# Patient Record
Sex: MALE | Race: BLACK OR AFRICAN AMERICAN | Employment: FULL TIME | URBAN - METROPOLITAN AREA
[De-identification: names, ages, dates, MRNs, and addresses within clinical notes are randomized per-mention and may not be internally consistent; named-entity substitution may affect disease eponyms.]

---

## 2018-02-09 ENCOUNTER — HOSPITAL ENCOUNTER (EMERGENCY)
Age: 48
Discharge: HOME | End: 2018-02-09
Attending: EMERGENCY MEDICINE
Payer: COMMERCIAL

## 2018-02-09 ENCOUNTER — APPOINTMENT (OUTPATIENT)
Dept: GENERAL RADIOLOGY | Age: 48
End: 2018-02-09
Attending: EMERGENCY MEDICINE
Payer: COMMERCIAL

## 2018-02-09 VITALS
HEART RATE: 70 BPM | RESPIRATION RATE: 16 BRPM | HEIGHT: 70 IN | TEMPERATURE: 98.8 F | DIASTOLIC BLOOD PRESSURE: 92 MMHG | BODY MASS INDEX: 38.65 KG/M2 | OXYGEN SATURATION: 100 % | WEIGHT: 270 LBS | SYSTOLIC BLOOD PRESSURE: 137 MMHG

## 2018-02-09 DIAGNOSIS — S67.10XA CRUSHING INJURY OF FINGER, LEFT: Primary | ICD-10-CM

## 2018-02-09 DIAGNOSIS — S61.309A NAIL AVULSION, FINGER, INITIAL ENCOUNTER: ICD-10-CM

## 2018-02-09 PROCEDURE — 99282 EMERGENCY DEPT VISIT SF MDM: CPT

## 2018-02-09 PROCEDURE — 75810000055 HC AVULSION NAIL PLATE SIMPLE

## 2018-02-09 PROCEDURE — 73140 X-RAY EXAM OF FINGER(S): CPT

## 2018-02-09 RX ORDER — CEPHALEXIN 500 MG/1
500 CAPSULE ORAL 2 TIMES DAILY
Qty: 14 CAP | Refills: 0 | Status: SHIPPED | OUTPATIENT
Start: 2018-02-09 | End: 2018-02-16

## 2018-02-09 RX ORDER — NAPROXEN 500 MG/1
500 TABLET ORAL 2 TIMES DAILY WITH MEALS
Qty: 20 TAB | Refills: 0 | Status: SHIPPED | OUTPATIENT
Start: 2018-02-09 | End: 2018-02-19

## 2018-02-09 NOTE — LETTER
[facility] [place] 
[facility] 
[address] [address] 
[phone] Work/School Note Date: 2/9/2018 To Whom It May concern: 
 
Jaquelyn Mcburney was seen and treated today in the emergency room by the following provider(s): 
Attending Provider: Kendrick Bedolla MD 
Physician Assistant: LES Novoa. Jaquelyn Mcburney may return to work on 2/12/18.  
 
Sincerely, 
 
 
 
 
 
 
Junie Quintero PA-C

## 2018-02-10 NOTE — DISCHARGE INSTRUCTIONS
Toenail or Fingernail Avulsion: Care Instructions  Your Care Instructions  Losing a toenail or fingernail because of an injury is called avulsion. The nail may be completely or partially torn off after a trauma to the area. Your doctor may have removed the nail, put part of it back into place, or repaired the nail bed. Your toe or finger may be sore after treatment. You may have stitches. You may have some swelling, color changes, and bloody crusting on or around the wound for 2 or 3 days. This is normal. Taking good care of your wound at home will help it heal quickly and reduce your chance of infection. The wound should heal within a few weeks. If completely removed, fingernails may take 6 months to grow back. Toenails may take 12 to 18 months to grow back. Injured nails may look different when they grow back. Follow-up care is a key part of your treatment and safety. Be sure to make and go to all appointments, and call your doctor if you are having problems. It's also a good idea to know your test results and keep a list of the medicines you take. How can you care for yourself at home? · If possible, prop up the injured area on a pillow anytime you sit or lie down during the next 3 days. Try to keep it above the level of your heart. This will help reduce swelling. · Leave the bandage on, and if you have stitches, do not get them wet for the first 24 to 48 hours. Use a plastic bag to cover the area when you shower. · If your doctor told you how to care for your wound, follow your doctor's instructions. If you did not get instructions, follow this general advice:  ¨ After the first 24 to 48 hours, you can remove the bandage and gently wash around the wound with clean water 2 times a day. If the bandage sticks to the wound, use warm water to loosen it. Do not scrub or soak the area. ¨ You may cover the wound with a thin layer of petroleum jelly, such as Vaseline, and a nonstick bandage.   ¨ Apply more petroleum jelly and replace the bandage as needed. · Do not go swimming. · If you have stitches, do not remove them on your own. Your doctor will tell you when to return to have the stitches removed. · Be safe with medicines. Take pain medicines exactly as directed. ¨ If the doctor gave you a prescription medicine for pain, take it as prescribed. ¨ If you are not taking a prescription pain medicine, ask your doctor if you can take an over-the-counter medicine. · If your doctor prescribed antibiotics, take them as directed. Do not stop taking them just because you feel better. You need to take the full course of antibiotics. When should you call for help? Call your doctor now or seek immediate medical care if:  ? · The skin near the wound is cool or pale or changes color. ? · The wound starts to bleed, and blood soaks through the bandage. Oozing small amounts of blood is normal.   ? · You have signs of infection, such as:  ¨ Increased pain, swelling, warmth, or redness. ¨ Red streaks leading from your toe or finger. ¨ Pus draining from your toe or finger. ¨ Swollen lymph nodes in your neck, armpits, or groin. ¨ A fever. ? Watch closely for changes in your health, and be sure to contact your doctor if:  ? · You have problems with the nail as it grows back. ? · You do not get better as expected. Where can you learn more? Go to http://jeor-falguni.info/. Enter T757 in the search box to learn more about \"Toenail or Fingernail Avulsion: Care Instructions. \"  Current as of: October 13, 2016  Content Version: 11.4  © 2220-8937 [employer]. Care instructions adapted under license by [employer] (which disclaims liability or warranty for this information). If you have questions about a medical condition or this instruction, always ask your healthcare professional. [address] any warranty or liability for your use of this information.

## 2018-02-10 NOTE — ED PROVIDER NOTES
EMERGENCY DEPARTMENT HISTORY AND PHYSICAL EXAM    Date: 2/9/2018  Patient Name: Nitza Rivera    History of Presenting Illness     Chief Complaint   Patient presents with    Finger Pain         History Provided By: Patient    Chief Complaint: Left second finger pain  Duration: 2 hours   Timing:  Acute  Location: Left second finger  Severity: 8 out of 10  Associated Symptoms: denies any other associated signs or symptoms    Additional History (Context):   8:33 PM  Nitza Rivera is a 41 y.o. male with PMHX HTN and high cholesterol who presents abulatory to the emergency department C/O left second finger pain (rated 8/10), onset 2 hours ago. Notes no other associated sxs. Pt reports that he smashed his left second finger in between two desks at work. Nail avulsion noted, no damage to nailbed. Pt is right handed. Pt denies fever, chills, or any other sxs or complaints. PCP: Rosa Salvador MD    Current Outpatient Prescriptions   Medication Sig Dispense Refill    ATORVASTATIN CALCIUM (ATORVASTATIN PO) Take  by mouth.  cephALEXin (KEFLEX) 500 mg capsule Take 1 Cap by mouth two (2) times a day for 7 days. 14 Cap 0    naproxen (NAPROSYN) 500 mg tablet Take 1 Tab by mouth two (2) times daily (with meals) for 10 days. 20 Tab 0    divalproex DR (DEPAKOTE) 500 mg tablet Take  by mouth three (3) times daily.  carBAMazepine XR (TEGRETOL XR) 400 mg SR tablet Take 400 mg by mouth two (2) times a day.  lisinopril (PRINIVIL, ZESTRIL) 20 mg tablet Take  by mouth daily.  ibuprofen (MOTRIN) 800 mg tablet Take  by mouth.  propranolol (INDERAL) 10 mg tablet Take 20 mg by mouth three (3) times daily. Past History     Past Medical History:  Past Medical History:   Diagnosis Date    High cholesterol     Hypertension     Seizures ([address]        Past Surgical History:  History reviewed. No pertinent surgical history. Family History:  History reviewed. No pertinent family history.     Social History:  Social History   Substance Use Topics    Smoking status: Never Smoker    Smokeless tobacco: None    Alcohol use No       Allergies: Allergies   Allergen Reactions    Dilantin [Phenytoin Sodium Extended] Swelling    Keppra [Levetiracetam] Unknown (comments)         Review of Systems   Review of Systems   Constitutional: Negative for chills and fever. Skin: Positive for wound (left index finger smash injury). Negative for color change. All other systems reviewed and are negative. Physical Exam     Vitals:    02/09/18 2000   BP: (!) 137/92   Pulse: 70   Resp: 16   Temp: 98.8 °F (37.1 °C)   SpO2: 100%   Weight: 122.5 kg (270 lb)   Height: 5' 10\" (1.778 m)     Physical Exam   Constitutional: He is oriented to person, place, and time. He appears well-developed and well-nourished. No distress. HENT:   Head: Normocephalic and atraumatic. Eyes: Conjunctivae and EOM are normal. Pupils are equal, round, and reactive to light. Neck: Normal range of motion. Neck supple. Musculoskeletal: Normal range of motion. He exhibits tenderness. He exhibits no deformity. Left wrist: Normal.        Left hand: He exhibits tenderness. He exhibits normal range of motion, normal two-point discrimination, normal capillary refill, no deformity, no laceration and no swelling. Normal sensation noted. Normal strength noted. Hands:  Proximal lateral partial nail avulsion (<1/3 of nail) barely attached, mild venous bleeding   Neurological: He is alert and oriented to person, place, and time. Skin: Skin is warm and dry. Psychiatric: He has a normal mood and affect. His behavior is normal.   Nursing note and vitals reviewed. Diagnostic Study Results     Labs -   No results found for this or any previous visit (from the past 12 hour(s)).     Radiologic Studies -    XR 2ND FINGER LT MIN 2 V    (Results Pending)     8:37 PM  RADIOLOGY FINDINGS  Left second finer X-ray shows no fx  Pending review by Radiologist  Recorded by Rachelle Lopez ED Scribe, as dictated by Linda Campos PA-C    CT Results  (Last 48 hours)    None        CXR Results  (Last 48 hours)    None            Medical Decision Making   I am the first provider for this patient. I reviewed the vital signs, available nursing notes, past medical history, past surgical history, family history and social history. Vital Signs-Reviewed the patient's vital signs. Pulse Oximetry Analysis - 100% on RA     Records Reviewed: Nursing Notes    Provider Notes (Medical Decision Making):     Procedures: Other Procedure  Date/Time: 2/9/2018 8:44 PM  Performed by: Brannon Truong  Authorized by: Shannen ROBERSON     Consent:     Consent obtained:  Verbal    Consent given by:  Patient    Risks discussed:  Bleeding, infection and pain  Pre-procedure details:     Procedure prep: shurcleans. Anesthesia (see MAR for exact dosages): Anesthesia method:  None  Post-procedure details:     Patient tolerance of procedure: Tolerated well, no immediate complications  Comments:      Wound cleansed with shurcleans, partial nail that has avulsed was completely removed, wound beneath again cleansed & dressed        ED Course:   8:33 PM   Initial assessment performed. The patients presenting problems have been discussed, and they are in agreement with the care plan formulated and outlined with them. I have encouraged them to ask questions as they arise throughout their visit. Diagnosis and Disposition       DISCHARGE NOTE:  8:40 PM  Katelin Arreola's  results have been reviewed with him. He has been counseled regarding his diagnosis, treatment, and plan. He verbally conveys understanding and agreement of the signs, symptoms, diagnosis, treatment and prognosis and additionally agrees to follow up as discussed. He also agrees with the care-plan and conveys that all of his questions have been answered.   I have also provided discharge instructions for him that include: educational information regarding their diagnosis and treatment, and list of reasons why they would want to return to the ED prior to their follow-up appointment, should his condition change. He has been provided with education for proper emergency department utilization. CLINICAL IMPRESSION:    1. Crushing injury of finger, left    2. Nail avulsion, finger, initial encounter        PLAN:  1. D/C Home  2. Current Discharge Medication List      START taking these medications    Details   cephALEXin (KEFLEX) 500 mg capsule Take 1 Cap by mouth two (2) times a day for 7 days. Qty: 14 Cap, Refills: 0      naproxen (NAPROSYN) 500 mg tablet Take 1 Tab by mouth two (2) times daily (with meals) for 10 days. Qty: 20 Tab, Refills: 0           3. Follow-up Information     Follow up With Details Comments Contact Info    Your PCP Schedule an appointment as soon as possible for a visit in 2 days for PCP follow up     [facility] EMERGENCY DEPT  As needed, If symptoms worsen [address] [address]  [phone]        _______________________________    Attestations: This note is prepared by [employer], acting as Scribe for Peter Edwards PA-C. Peter Edwards PA-C:  The scribe's documentation has been prepared under my direction and personally reviewed by me in its entirety.   I confirm that the note above accurately reflects all work, treatment, procedures, and medical decision making performed by me.  _______________________________

## 2020-01-31 ENCOUNTER — HOSPITAL ENCOUNTER (EMERGENCY)
Age: 50
Discharge: HOME | End: 2020-02-01
Attending: EMERGENCY MEDICINE | Admitting: EMERGENCY MEDICINE
Payer: COMMERCIAL

## 2020-01-31 VITALS
WEIGHT: 263 LBS | OXYGEN SATURATION: 98 % | TEMPERATURE: 98.1 F | SYSTOLIC BLOOD PRESSURE: 137 MMHG | DIASTOLIC BLOOD PRESSURE: 74 MMHG | RESPIRATION RATE: 16 BRPM | BODY MASS INDEX: 37.65 KG/M2 | HEART RATE: 72 BPM | HEIGHT: 70 IN

## 2020-01-31 DIAGNOSIS — R19.7 NAUSEA VOMITING AND DIARRHEA: Primary | ICD-10-CM

## 2020-01-31 DIAGNOSIS — R11.2 NAUSEA VOMITING AND DIARRHEA: Primary | ICD-10-CM

## 2020-01-31 LAB
ALBUMIN SERPL-MCNC: 3.3 G/DL (ref 3.4–5)
ALBUMIN/GLOB SERPL: 0.8 {RATIO} (ref 0.8–1.7)
ALP SERPL-CCNC: 87 U/L (ref 45–117)
ALT SERPL-CCNC: 18 U/L (ref 16–61)
ANION GAP SERPL CALC-SCNC: 3 MMOL/L (ref 3–18)
AST SERPL-CCNC: 16 U/L (ref 10–38)
BASOPHILS # BLD: 0 K/UL (ref 0–0.1)
BASOPHILS NFR BLD: 0 % (ref 0–2)
BILIRUB SERPL-MCNC: 0.3 MG/DL (ref 0.2–1)
BUN SERPL-MCNC: 17 MG/DL (ref 7–18)
BUN/CREAT SERPL: 16 (ref 12–20)
CALCIUM SERPL-MCNC: 8.6 MG/DL (ref 8.5–10.1)
CHLORIDE SERPL-SCNC: 106 MMOL/L (ref 100–111)
CO2 SERPL-SCNC: 33 MMOL/L (ref 21–32)
CREAT SERPL-MCNC: 1.07 MG/DL (ref 0.6–1.3)
DIFFERENTIAL METHOD BLD: NORMAL
EOSINOPHIL # BLD: 0.2 K/UL (ref 0–0.4)
EOSINOPHIL NFR BLD: 2 % (ref 0–5)
ERYTHROCYTE [DISTWIDTH] IN BLOOD BY AUTOMATED COUNT: 13.5 % (ref 11.6–14.5)
GLOBULIN SER CALC-MCNC: 4.3 G/DL (ref 2–4)
GLUCOSE SERPL-MCNC: 87 MG/DL (ref 74–99)
HCT VFR BLD AUTO: 41.6 % (ref 36–48)
HGB BLD-MCNC: 13.8 G/DL (ref 13–16)
LIPASE SERPL-CCNC: 197 U/L (ref 73–393)
LYMPHOCYTES # BLD: 1.7 K/UL (ref 0.9–3.6)
LYMPHOCYTES NFR BLD: 21 % (ref 21–52)
MCH RBC QN AUTO: 28.4 PG (ref 24–34)
MCHC RBC AUTO-ENTMCNC: 33.2 G/DL (ref 31–37)
MCV RBC AUTO: 85.6 FL (ref 74–97)
MONOCYTES # BLD: 0.8 K/UL (ref 0.05–1.2)
MONOCYTES NFR BLD: 10 % (ref 3–10)
NEUTS SEG # BLD: 5.7 K/UL (ref 1.8–8)
NEUTS SEG NFR BLD: 67 % (ref 40–73)
PLATELET # BLD AUTO: 144 K/UL (ref 135–420)
PMV BLD AUTO: 11.7 FL (ref 9.2–11.8)
POTASSIUM SERPL-SCNC: 4.1 MMOL/L (ref 3.5–5.5)
PROT SERPL-MCNC: 7.6 G/DL (ref 6.4–8.2)
RBC # BLD AUTO: 4.86 M/UL (ref 4.7–5.5)
SODIUM SERPL-SCNC: 142 MMOL/L (ref 136–145)
WBC # BLD AUTO: 8.4 K/UL (ref 4.6–13.2)

## 2020-01-31 PROCEDURE — 83690 ASSAY OF LIPASE: CPT

## 2020-01-31 PROCEDURE — 93005 ELECTROCARDIOGRAM TRACING: CPT

## 2020-01-31 PROCEDURE — 80053 COMPREHEN METABOLIC PANEL: CPT

## 2020-01-31 PROCEDURE — 85025 COMPLETE CBC W/AUTO DIFF WBC: CPT

## 2020-01-31 PROCEDURE — 99284 EMERGENCY DEPT VISIT MOD MDM: CPT

## 2020-01-31 RX ORDER — IBUPROFEN 600 MG/1
600 TABLET ORAL
Status: DISCONTINUED | OUTPATIENT
Start: 2020-01-31 | End: 2020-02-01 | Stop reason: HOSPADM

## 2020-02-01 LAB
ATRIAL RATE: 68 BPM
CALCULATED P AXIS, ECG09: 119 DEGREES
CALCULATED R AXIS, ECG10: 166 DEGREES
CALCULATED T AXIS, ECG11: 147 DEGREES
DIAGNOSIS, 93000: NORMAL
P-R INTERVAL, ECG05: 152 MS
Q-T INTERVAL, ECG07: 418 MS
QRS DURATION, ECG06: 80 MS
QTC CALCULATION (BEZET), ECG08: 444 MS
VENTRICULAR RATE, ECG03: 68 BPM

## 2020-02-01 RX ORDER — ONDANSETRON 4 MG/1
4 TABLET, FILM COATED ORAL
Qty: 12 TAB | Refills: 0 | Status: SHIPPED | OUTPATIENT
Start: 2020-02-01

## 2020-02-01 RX ORDER — DICYCLOMINE HYDROCHLORIDE 20 MG/1
20 TABLET ORAL EVERY 6 HOURS
Qty: 30 TAB | Refills: 0 | Status: SHIPPED | OUTPATIENT
Start: 2020-02-01

## 2020-02-01 NOTE — ED PROVIDER NOTES
EMERGENCY DEPARTMENT HISTORY AND PHYSICAL EXAM    Date: 1/31/2020  Patient Name: Radha Duong    History of Presenting Illness     Chief Complaint   Patient presents with    Dizziness    Vomiting    Nausea         History Provided By: Patient    Additional History (Context):   11:43 PM  Radha Duogn is a 54 y.o. male with PMHX of HTN, Seizures, and HTN who presents to the emergency department C/O vomiting. Associated sxs include lightheadedness, dizziness, nausea, headache, and vomiting. The pt reports that he was at work when his stomach \"got a feeling in it\". He rushed to the bathroom and started vomiting. The pt believes his sxs may be related to  the school lunch he had. Notes his stools were recently hard but returned to being soft. Denies sick contact. Denies blood in stool, difficulty urinating. Social History:  Denies EtOH, cigarette and illicit drug use. Family History:  HTN only     Surgical History:      PCP: Other, MD Rosa    Current Facility-Administered Medications   Medication Dose Route Frequency Provider Last Rate Last Dose    sodium chloride 0.9 % bolus infusion 1,000 mL  1,000 mL IntraVENous ONCE Jodee Marquez MD        ibuprofen (MOTRIN) tablet 600 mg  600 mg Oral NOW Jodee Marquez MD         Current Outpatient Medications   Medication Sig Dispense Refill    dicyclomine (BENTYL) 20 mg tablet Take 1 Tab by mouth every six (6) hours. 30 Tab 0    ondansetron hcl (ZOFRAN) 4 mg tablet Take 1 Tab by mouth every eight (8) hours as needed for Nausea. 12 Tab 0    ATORVASTATIN CALCIUM (ATORVASTATIN PO) Take  by mouth.  divalproex DR (DEPAKOTE) 500 mg tablet Take  by mouth three (3) times daily.  carBAMazepine XR (TEGRETOL XR) 400 mg SR tablet Take 400 mg by mouth two (2) times a day.  lisinopril (PRINIVIL, ZESTRIL) 20 mg tablet Take  by mouth daily.  propranolol (INDERAL) 10 mg tablet Take 20 mg by mouth three (3) times daily.          Past History     Past Medical History:  Past Medical History:   Diagnosis Date    High cholesterol     Hypertension     Seizures ([address]        Past Surgical History:  History reviewed. No pertinent surgical history. Family History:  History reviewed. No pertinent family history. Social History:  Social History     Tobacco Use    Smoking status: Never Smoker   Substance Use Topics    Alcohol use: No    Drug use: No       Allergies: Allergies   Allergen Reactions    Dilantin [Phenytoin Sodium Extended] Swelling    Keppra [Levetiracetam] Unknown (comments)         Review of Systems   Review of Systems   Constitutional: Negative for chills, diaphoresis, fever and unexpected weight change. HENT: Negative for congestion, drooling, ear pain, rhinorrhea, sore throat, tinnitus and trouble swallowing. Eyes: Negative for photophobia, pain, redness and visual disturbance. Respiratory: Negative for cough, choking, chest tightness, shortness of breath, wheezing and stridor. Cardiovascular: Negative for chest pain, palpitations and leg swelling. Gastrointestinal: Positive for nausea and vomiting. Negative for abdominal distention, abdominal pain, anal bleeding, blood in stool, constipation and diarrhea. Genitourinary: Negative for difficulty urinating, dysuria, flank pain, frequency, hematuria and urgency. Musculoskeletal: Negative for arthralgias, back pain and neck pain. Skin: Negative for color change, rash and wound. Neurological: Positive for dizziness, light-headedness and headaches. Negative for seizures, syncope and speech difficulty. Hematological: Does not bruise/bleed easily. Psychiatric/Behavioral: Negative for agitation, behavioral problems, hallucinations, self-injury and suicidal ideas. The patient is not hyperactive.         Physical Exam     Vitals:    01/31/20 2105   BP: 137/74   Pulse: 72   Resp: 16   Temp: 98.1 °F (36.7 °C)   SpO2: 98%   Weight: 119.3 kg (263 lb)   Height: 5' 10\" (1.778 m) Physical Exam  Vitals signs and nursing note reviewed. Constitutional:       General: He is not in acute distress. Appearance: He is well-developed. He is not diaphoretic. HENT:      Head: Normocephalic and atraumatic. Right Ear: External ear normal.      Left Ear: External ear normal.      Mouth/Throat:      Pharynx: No oropharyngeal exudate. Eyes:      General: No scleral icterus. Conjunctiva/sclera: Conjunctivae normal.      Pupils: Pupils are equal, round, and reactive to light. Comments: No pallor   Neck:      Musculoskeletal: Normal range of motion and neck supple. Thyroid: No thyromegaly. Vascular: No JVD. Trachea: No tracheal deviation. Cardiovascular:      Rate and Rhythm: Normal rate and regular rhythm. Heart sounds: Normal heart sounds. Pulmonary:      Effort: Pulmonary effort is normal. No respiratory distress. Breath sounds: Normal breath sounds. No stridor. Abdominal:      General: Bowel sounds are normal. There is no distension. Palpations: Abdomen is soft. Tenderness: There is no abdominal tenderness. There is no guarding or rebound. Musculoskeletal: Normal range of motion. General: No tenderness. Comments: No soft tissue injuries   Lymphadenopathy:      Cervical: No cervical adenopathy. Skin:     General: Skin is warm and dry. Findings: No erythema or rash. Neurological:      Mental Status: He is alert and oriented to person, place, and time. Cranial Nerves: No cranial nerve deficit. Coordination: Coordination normal.      Deep Tendon Reflexes: Reflexes are normal and symmetric. Reflexes normal.   Psychiatric:         Speech: Speech is delayed. Behavior: Behavior normal.         Thought Content:  Thought content normal.         Judgment: Judgment normal.         Diagnostic Study Results     Labs -     Recent Results (from the past 12 hour(s))   EKG, 12 LEAD, INITIAL    Collection Time: 01/31/20  9:30 PM   Result Value Ref Range    Ventricular Rate 68 BPM    Atrial Rate 68 BPM    P-R Interval 152 ms    QRS Duration 80 ms    Q-T Interval 418 ms    QTC Calculation (Bezet) 444 ms    Calculated P Axis 119 degrees    Calculated R Axis 166 degrees    Calculated T Axis 147 degrees    Diagnosis       Suspect arm lead reversal, interpretation assumes no reversal  Normal sinus rhythm  Left posterior fascicular block  Nonspecific T wave abnormality  Abnormal ECG  When compared with ECG of 21-AUG-2014 12:24,  Left posterior fascicular block is now present  T wave inversion now evident in Lateral leads     CBC WITH AUTOMATED DIFF    Collection Time: 01/31/20  9:40 PM   Result Value Ref Range    WBC 8.4 4.6 - 13.2 K/uL    RBC 4.86 4.70 - 5.50 M/uL    HGB 13.8 13.0 - 16.0 g/dL    HCT 41.6 36.0 - 48.0 %    MCV 85.6 74.0 - 97.0 FL    MCH 28.4 24.0 - 34.0 PG    MCHC 33.2 31.0 - 37.0 g/dL    RDW 13.5 11.6 - 14.5 %    PLATELET 384 918 - 949 K/uL    MPV 11.7 9.2 - 11.8 FL    NEUTROPHILS 67 40 - 73 %    LYMPHOCYTES 21 21 - 52 %    MONOCYTES 10 3 - 10 %    EOSINOPHILS 2 0 - 5 %    BASOPHILS 0 0 - 2 %    ABS. NEUTROPHILS 5.7 1.8 - 8.0 K/UL    ABS. LYMPHOCYTES 1.7 0.9 - 3.6 K/UL    ABS. MONOCYTES 0.8 0.05 - 1.2 K/UL    ABS. EOSINOPHILS 0.2 0.0 - 0.4 K/UL    ABS. BASOPHILS 0.0 0.0 - 0.1 K/UL    DF AUTOMATED     METABOLIC PANEL, COMPREHENSIVE    Collection Time: 01/31/20  9:40 PM   Result Value Ref Range    Sodium 142 136 - 145 mmol/L    Potassium 4.1 3.5 - 5.5 mmol/L    Chloride 106 100 - 111 mmol/L    CO2 33 (H) 21 - 32 mmol/L    Anion gap 3 3.0 - 18 mmol/L    Glucose 87 74 - 99 mg/dL    BUN 17 7.0 - 18 MG/DL    Creatinine 1.07 0.6 - 1.3 MG/DL    BUN/Creatinine ratio 16 12 - 20      GFR est AA >60 >60 ml/min/1.73m2    GFR est non-AA >60 >60 ml/min/1.73m2    Calcium 8.6 8.5 - 10.1 MG/DL    Bilirubin, total 0.3 0.2 - 1.0 MG/DL    ALT (SGPT) 18 16 - 61 U/L    AST (SGOT) 16 10 - 38 U/L    Alk.  phosphatase 87 45 - 117 U/L Protein, total 7.6 6.4 - 8.2 g/dL    Albumin 3.3 (L) 3.4 - 5.0 g/dL    Globulin 4.3 (H) 2.0 - 4.0 g/dL    A-G Ratio 0.8 0.8 - 1.7     LIPASE    Collection Time: 01/31/20  9:40 PM   Result Value Ref Range    Lipase 197 73 - 393 U/L       Radiologic Studies -   No orders to display     CT Results  (Last 48 hours)    None        CXR Results  (Last 48 hours)    None          Medications given in the ED-  Medications   sodium chloride 0.9 % bolus infusion 1,000 mL (has no administration in time range)   ibuprofen (MOTRIN) tablet 600 mg (has no administration in time range)         Medical Decision Making   I am the first provider for this patient. I reviewed the vital signs, available nursing notes, past medical history, past surgical history, family history and social history. Vital Signs-Reviewed the patient's vital signs. Pulse Oximetry Analysis - 98% on RA     Cardiac Monitor:  Rate: 65 bpm  Rhythm: NSR    EKG interpretation: (Preliminary)  11:43 PM   Rate: 65 BPM; Rhythm: NSR; No ST Changes; No Arrhythmia  EKG read by Ga Ocasio MD at 12:25 AM     Records Reviewed: NURSING NOTES AND PREVIOUS MEDICAL RECORDS    Provider Notes (Medical Decision Making): DDx: Sxs strongly suggest viral illness. Gastroenteritis mostly than acute food poisoning. No signs of dysentery. Abdomen is not tender. Unlikely biliary disease, appendicitis, surgical, infection, or blockage    Procedures:  Procedures    ED Course:   11:43 PM: Initial assessment performed. The patients presenting problems have been discussed, and they are in agreement with the care plan formulated and outlined with them. I have encouraged them to ask questions as they arise throughout their visit. Diagnosis and Disposition       DISCHARGE NOTE:  12:23 AM  Urvashi Arreola's  results have been reviewed with him. He has been counseled regarding his diagnosis, treatment, and plan.   He verbally conveys understanding and agreement of the signs, symptoms, diagnosis, treatment and prognosis and additionally agrees to follow up as discussed. He also agrees with the care-plan and conveys that all of his questions have been answered. I have also provided discharge instructions for him that include: educational information regarding their diagnosis and treatment, and list of reasons why they would want to return to the ED prior to their follow-up appointment, should his condition change. He has been provided with education for proper emergency department utilization. CLINICAL IMPRESSION:    1. Nausea vomiting and diarrhea        PLAN:  1. D/C Home  2. Current Discharge Medication List      START taking these medications    Details   dicyclomine (BENTYL) 20 mg tablet Take 1 Tab by mouth every six (6) hours. Qty: 30 Tab, Refills: 0      ondansetron hcl (ZOFRAN) 4 mg tablet Take 1 Tab by mouth every eight (8) hours as needed for Nausea. Qty: 12 Tab, Refills: 0         CONTINUE these medications which have NOT CHANGED    Details   ATORVASTATIN CALCIUM (ATORVASTATIN PO) Take  by mouth. divalproex DR (DEPAKOTE) 500 mg tablet Take  by mouth three (3) times daily. carBAMazepine XR (TEGRETOL XR) 400 mg SR tablet Take 400 mg by mouth two (2) times a day. lisinopril (PRINIVIL, ZESTRIL) 20 mg tablet Take  by mouth daily. propranolol (INDERAL) 10 mg tablet Take 20 mg by mouth three (3) times daily. 3.   Follow-up Information     Follow up With Specialties Details Why [address]  Schedule an appointment as soon as possible for a visit in 2 days For primary care follow up [address] [facility] [address] [facility]    THE [facility] EMERGENCY DEPT Emergency Medicine Go to As needed, if symptoms worsen [address] [address]  [phone]        _______________________________    This note was partially transcribed via voice recognition software.  Although efforts have been made to catch any discrepancies, it may contain sound alike words, grammatical errors, or nonsensical words. _______________________________    Attestations: This note is prepared by Glenn Vega, acting as Scribe for Brit Ledesma. Erica Avalos MD.    Brit Ledesma. Erica Avalos MD:  The scribe's documentation has been prepared under my direction and personally reviewed by me in its entirety.   I confirm that the note above accurately reflects all work, treatment, procedures, and medical decision making performed by me.  _______________________________

## 2020-02-01 NOTE — ED TRIAGE NOTES
Arrives via ems with c/o feeling light headed/dizzy and n/v while working. Pt works at [employer].

## 2020-02-01 NOTE — DISCHARGE INSTRUCTIONS
Diarrhea: Care Instructions  Your Care Instructions    Diarrhea is loose, watery stools (bowel movements). The exact cause is often hard to find. Sometimes diarrhea is your body's way of getting rid of what caused an upset stomach. Viruses, food poisoning, and many medicines can cause diarrhea. Some people get diarrhea in response to emotional stress, anxiety, or certain foods. Almost everyone has diarrhea now and then. It usually isn't serious, and your stools will return to normal soon. The important thing to do is replace the fluids you have lost, so you can prevent dehydration. The doctor has checked you carefully, but problems can develop later. If you notice any problems or new symptoms, get medical treatment right away. Follow-up care is a key part of your treatment and safety. Be sure to make and go to all appointments, and call your doctor if you are having problems. It's also a good idea to know your test results and keep a list of the medicines you take. How can you care for yourself at home? · Watch for signs of dehydration, which means your body has lost too much water. Dehydration is a serious condition and should be treated right away. Signs of dehydration are:  ? Increasing thirst and dry eyes and mouth. ? Feeling faint or lightheaded. ? A smaller amount of urine than normal.  · To prevent dehydration, drink plenty of fluids. Choose water and other caffeine-free clear liquids until you feel better. If you have kidney, heart, or liver disease and have to limit fluids, talk with your doctor before you increase the amount of fluids you drink. · Begin eating small amounts of mild foods the next day, if you feel like it. ? Try yogurt that has live cultures of Lactobacillus. (Check the label.)  ? Avoid spicy foods, fruits, alcohol, and caffeine until 48 hours after all symptoms are gone. ? Avoid chewing gum that contains sorbitol. ?  Avoid dairy products (except for yogurt with Lactobacillus) while you have diarrhea and for 3 days after symptoms are gone. · The doctor may recommend that you take over-the-counter medicine, such as loperamide (Imodium), if you still have diarrhea after 6 hours. Read and follow all instructions on the label. Do not use this medicine if you have bloody diarrhea, a high fever, or other signs of serious illness. Call your doctor if you think you are having a problem with your medicine. When should you call for help? Call 911 anytime you think you may need emergency care. For example, call if:    · You passed out (lost consciousness).     · Your stools are maroon or very bloody.    Call your doctor now or seek immediate medical care if:    · You are dizzy or lightheaded, or you feel like you may faint.     · Your stools are black and look like tar, or they have streaks of blood.     · You have new or worse belly pain.     · You have symptoms of dehydration, such as:  ? Dry eyes and a dry mouth. ? Passing only a little dark urine. ? Feeling thirstier than usual.     · You have a new or higher fever.    Watch closely for changes in your health, and be sure to contact your doctor if:    · Your diarrhea is getting worse.     · You see pus in the diarrhea.     · You are not getting better after 2 days (48 hours). Where can you learn more? Go to http://jero-falguni.info/. Enter V270 in the search box to learn more about \"Diarrhea: Care Instructions. \"  Current as of: June 26, 2019  Content Version: 12.2  © 6388-1475 [employer]. Care instructions adapted under license by [employer] (which disclaims liability or warranty for this information). If you have questions about a medical condition or this instruction, always ask your healthcare professional. [address] any warranty or liability for your use of this information.             Nausea and Vomiting: Care Instructions  Your Care Instructions    When you are nauseated, you may feel weak and sweaty and notice a lot of saliva in your mouth. Nausea often leads to vomiting. Most of the time you do not need to worry about nausea and vomiting, but they can be signs of other illnesses. Two common causes of nausea and vomiting are stomach flu and food poisoning. Nausea and vomiting from viral stomach flu will usually start to improve within 24 hours. Nausea and vomiting from food poisoning may last from 12 to 48 hours. The doctor has checked you carefully, but problems can develop later. If you notice any problems or new symptoms, get medical treatment right away. Follow-up care is a key part of your treatment and safety. Be sure to make and go to all appointments, and call your doctor if you are having problems. It's also a good idea to know your test results and keep a list of the medicines you take. How can you care for yourself at home? · To prevent dehydration, drink plenty of fluids, enough so that your urine is light yellow or clear like water. Choose water and other caffeine-free clear liquids until you feel better. If you have kidney, heart, or liver disease and have to limit fluids, talk with your doctor before you increase the amount of fluids you drink. · Rest in bed until you feel better. · When you are able to eat, try clear soups, mild foods, and liquids until all symptoms are gone for 12 to 48 hours. Other good choices include dry toast, crackers, cooked cereal, and gelatin dessert, such as Jell-O. When should you call for help? Call 911 anytime you think you may need emergency care. For example, call if:    · You passed out (lost consciousness).    Call your doctor now or seek immediate medical care if:    · You have symptoms of dehydration, such as:  ? Dry eyes and a dry mouth. ? Passing only a little dark urine. ?  Feeling thirstier than usual.     · You have new or worsening belly pain.     · You have a new or higher fever.     · You vomit blood or what looks like coffee grounds.    Watch closely for changes in your health, and be sure to contact your doctor if:    · You have ongoing nausea and vomiting.     · Your vomiting is getting worse.     · Your vomiting lasts longer than 2 days.     · You are not getting better as expected. Where can you learn more? Go to http://jero-falguni.info/. Enter 25 [id number] in the search box to learn more about \"Nausea and Vomiting: Care Instructions. \"  Current as of: June 26, 2019  Content Version: 12.2  © 4800-0055 [employer]. Care instructions adapted under license by [employer] (which disclaims liability or warranty for this information). If you have questions about a medical condition or this instruction, always ask your healthcare professional. [address] any warranty or liability for your use of this information.

## 2020-02-03 LAB
ATRIAL RATE: 65 BPM
CALCULATED P AXIS, ECG09: 58 DEGREES
CALCULATED R AXIS, ECG10: 24 DEGREES
CALCULATED T AXIS, ECG11: 39 DEGREES
DIAGNOSIS, 93000: NORMAL
P-R INTERVAL, ECG05: 154 MS
Q-T INTERVAL, ECG07: 396 MS
QRS DURATION, ECG06: 76 MS
QTC CALCULATION (BEZET), ECG08: 411 MS
VENTRICULAR RATE, ECG03: 65 BPM

## 2024-03-07 ENCOUNTER — HOSPITAL ENCOUNTER (INPATIENT)
Facility: HOSPITAL | Age: 54
LOS: 2 days | Discharge: HOME | End: 2024-03-09
Attending: EMERGENCY MEDICINE | Admitting: INTERNAL MEDICINE
Payer: COMMERCIAL

## 2024-03-07 ENCOUNTER — APPOINTMENT (OUTPATIENT)
Facility: HOSPITAL | Age: 54
End: 2024-03-07
Payer: COMMERCIAL

## 2024-03-07 DIAGNOSIS — M79.605 LEG PAIN, BILATERAL: Primary | ICD-10-CM

## 2024-03-07 DIAGNOSIS — G93.40 ENCEPHALOPATHY: ICD-10-CM

## 2024-03-07 DIAGNOSIS — M79.604 LEG PAIN, BILATERAL: Primary | ICD-10-CM

## 2024-03-07 PROBLEM — R27.0 ATAXIA: Status: ACTIVE | Noted: 2024-03-07

## 2024-03-07 LAB
ALBUMIN SERPL-MCNC: 3.1 G/DL (ref 3.4–5)
ALBUMIN/GLOB SERPL: 0.8 (ref 0.8–1.7)
ALP SERPL-CCNC: 94 U/L (ref 45–117)
ALT SERPL-CCNC: 25 U/L (ref 16–61)
AMPHET UR QL SCN: NEGATIVE
ANION GAP SERPL CALC-SCNC: 3 MMOL/L (ref 3–18)
AST SERPL-CCNC: 34 U/L (ref 10–38)
BARBITURATES UR QL SCN: NEGATIVE
BASOPHILS # BLD: 0 K/UL (ref 0–0.1)
BASOPHILS NFR BLD: 0 % (ref 0–2)
BENZODIAZ UR QL: NEGATIVE
BILIRUB SERPL-MCNC: 0.3 MG/DL (ref 0.2–1)
BUN SERPL-MCNC: 13 MG/DL (ref 7–18)
BUN/CREAT SERPL: 12 (ref 12–20)
CALCIUM SERPL-MCNC: 8.4 MG/DL (ref 8.5–10.1)
CANNABINOIDS UR QL SCN: NEGATIVE
CHLORIDE SERPL-SCNC: 108 MMOL/L (ref 100–111)
CO2 SERPL-SCNC: 31 MMOL/L (ref 21–32)
COCAINE UR QL SCN: NEGATIVE
CREAT SERPL-MCNC: 1.08 MG/DL (ref 0.6–1.3)
DIFFERENTIAL METHOD BLD: ABNORMAL
EOSINOPHIL # BLD: 0 K/UL (ref 0–0.4)
EOSINOPHIL NFR BLD: 0 % (ref 0–5)
ERYTHROCYTE [DISTWIDTH] IN BLOOD BY AUTOMATED COUNT: 12.8 % (ref 11.6–14.5)
GLOBULIN SER CALC-MCNC: 4.1 G/DL (ref 2–4)
GLUCOSE BLD STRIP.AUTO-MCNC: 103 MG/DL (ref 70–110)
GLUCOSE SERPL-MCNC: 110 MG/DL (ref 74–99)
HCT VFR BLD AUTO: 42.4 % (ref 36–48)
HGB BLD-MCNC: 14.3 G/DL (ref 13–16)
IMM GRANULOCYTES # BLD AUTO: 0 K/UL (ref 0–0.04)
IMM GRANULOCYTES NFR BLD AUTO: 1 % (ref 0–0.5)
INR PPP: 1 (ref 0.9–1.1)
LYMPHOCYTES # BLD: 2.3 K/UL (ref 0.9–3.6)
LYMPHOCYTES NFR BLD: 29 % (ref 21–52)
Lab: NORMAL
MCH RBC QN AUTO: 30.3 PG (ref 24–34)
MCHC RBC AUTO-ENTMCNC: 33.7 G/DL (ref 31–37)
MCV RBC AUTO: 89.8 FL (ref 78–100)
METHADONE UR QL: NEGATIVE
MONOCYTES # BLD: 0.8 K/UL (ref 0.05–1.2)
MONOCYTES NFR BLD: 10 % (ref 3–10)
NEUTS SEG # BLD: 4.7 K/UL (ref 1.8–8)
NEUTS SEG NFR BLD: 59 % (ref 40–73)
NRBC # BLD: 0 K/UL (ref 0–0.01)
NRBC BLD-RTO: 0 PER 100 WBC
OPIATES UR QL: NEGATIVE
PCP UR QL: NEGATIVE
PLATELET # BLD AUTO: 147 K/UL (ref 135–420)
PMV BLD AUTO: 11.7 FL (ref 9.2–11.8)
POTASSIUM SERPL-SCNC: 3.7 MMOL/L (ref 3.5–5.5)
PROT SERPL-MCNC: 7.2 G/DL (ref 6.4–8.2)
PROTHROMBIN TIME: 13.1 SEC (ref 11.9–14.7)
RBC # BLD AUTO: 4.72 M/UL (ref 4.35–5.65)
SODIUM SERPL-SCNC: 142 MMOL/L (ref 136–145)
TROPONIN I SERPL HS-MCNC: 3 NG/L (ref 0–78)
WBC # BLD AUTO: 8 K/UL (ref 4.6–13.2)

## 2024-03-07 PROCEDURE — 70450 CT HEAD/BRAIN W/O DYE: CPT

## 2024-03-07 PROCEDURE — 80053 COMPREHEN METABOLIC PANEL: CPT

## 2024-03-07 PROCEDURE — 70498 CT ANGIOGRAPHY NECK: CPT

## 2024-03-07 PROCEDURE — 85025 COMPLETE CBC W/AUTO DIFF WBC: CPT

## 2024-03-07 PROCEDURE — 95816 EEG AWAKE AND DROWSY: CPT | Performed by: INTERNAL MEDICINE

## 2024-03-07 PROCEDURE — 84484 ASSAY OF TROPONIN QUANT: CPT

## 2024-03-07 PROCEDURE — 6360000002 HC RX W HCPCS: Performed by: INTERNAL MEDICINE

## 2024-03-07 PROCEDURE — 6360000004 HC RX CONTRAST MEDICATION: Performed by: EMERGENCY MEDICINE

## 2024-03-07 PROCEDURE — 99222 1ST HOSP IP/OBS MODERATE 55: CPT | Performed by: INTERNAL MEDICINE

## 2024-03-07 PROCEDURE — 93005 ELECTROCARDIOGRAM TRACING: CPT | Performed by: EMERGENCY MEDICINE

## 2024-03-07 PROCEDURE — 6370000000 HC RX 637 (ALT 250 FOR IP): Performed by: INTERNAL MEDICINE

## 2024-03-07 PROCEDURE — 82962 GLUCOSE BLOOD TEST: CPT

## 2024-03-07 PROCEDURE — 71045 X-RAY EXAM CHEST 1 VIEW: CPT

## 2024-03-07 PROCEDURE — 80307 DRUG TEST PRSMV CHEM ANLYZR: CPT

## 2024-03-07 PROCEDURE — 2580000003 HC RX 258: Performed by: INTERNAL MEDICINE

## 2024-03-07 PROCEDURE — 85610 PROTHROMBIN TIME: CPT

## 2024-03-07 PROCEDURE — 99285 EMERGENCY DEPT VISIT HI MDM: CPT

## 2024-03-07 PROCEDURE — 1100000003 HC PRIVATE W/ TELEMETRY

## 2024-03-07 RX ORDER — ONDANSETRON 2 MG/ML
4 INJECTION INTRAMUSCULAR; INTRAVENOUS EVERY 6 HOURS PRN
Status: DISCONTINUED | OUTPATIENT
Start: 2024-03-07 | End: 2024-03-09 | Stop reason: HOSPADM

## 2024-03-07 RX ORDER — DICYCLOMINE HYDROCHLORIDE 10 MG/1
20 CAPSULE ORAL EVERY 6 HOURS
Status: DISCONTINUED | OUTPATIENT
Start: 2024-03-07 | End: 2024-03-09 | Stop reason: HOSPADM

## 2024-03-07 RX ORDER — ATORVASTATIN CALCIUM 20 MG/1
40 TABLET, FILM COATED ORAL NIGHTLY
Status: DISCONTINUED | OUTPATIENT
Start: 2024-03-07 | End: 2024-03-08

## 2024-03-07 RX ORDER — ACETAMINOPHEN 650 MG/1
650 SUPPOSITORY RECTAL EVERY 6 HOURS PRN
Status: DISCONTINUED | OUTPATIENT
Start: 2024-03-07 | End: 2024-03-09 | Stop reason: HOSPADM

## 2024-03-07 RX ORDER — ASPIRIN 325 MG
325 TABLET ORAL DAILY
Status: DISCONTINUED | OUTPATIENT
Start: 2024-03-07 | End: 2024-03-08

## 2024-03-07 RX ORDER — CARBAMAZEPINE 100 MG/1
400 TABLET, EXTENDED RELEASE ORAL 2 TIMES DAILY
Status: DISCONTINUED | OUTPATIENT
Start: 2024-03-07 | End: 2024-03-09 | Stop reason: HOSPADM

## 2024-03-07 RX ORDER — SODIUM CHLORIDE 9 MG/ML
INJECTION, SOLUTION INTRAVENOUS PRN
Status: DISCONTINUED | OUTPATIENT
Start: 2024-03-07 | End: 2024-03-09 | Stop reason: HOSPADM

## 2024-03-07 RX ORDER — SODIUM CHLORIDE 0.9 % (FLUSH) 0.9 %
5-40 SYRINGE (ML) INJECTION PRN
Status: DISCONTINUED | OUTPATIENT
Start: 2024-03-07 | End: 2024-03-09 | Stop reason: HOSPADM

## 2024-03-07 RX ORDER — ONDANSETRON 4 MG/1
4 TABLET, ORALLY DISINTEGRATING ORAL EVERY 8 HOURS PRN
Status: DISCONTINUED | OUTPATIENT
Start: 2024-03-07 | End: 2024-03-09 | Stop reason: HOSPADM

## 2024-03-07 RX ORDER — SODIUM CHLORIDE 0.9 % (FLUSH) 0.9 %
5-40 SYRINGE (ML) INJECTION EVERY 12 HOURS SCHEDULED
Status: DISCONTINUED | OUTPATIENT
Start: 2024-03-07 | End: 2024-03-09 | Stop reason: HOSPADM

## 2024-03-07 RX ORDER — DIVALPROEX SODIUM 500 MG/1
1500 TABLET, DELAYED RELEASE ORAL EVERY 12 HOURS SCHEDULED
Status: DISCONTINUED | OUTPATIENT
Start: 2024-03-08 | End: 2024-03-08

## 2024-03-07 RX ORDER — MAGNESIUM SULFATE IN WATER 40 MG/ML
2000 INJECTION, SOLUTION INTRAVENOUS PRN
Status: DISCONTINUED | OUTPATIENT
Start: 2024-03-07 | End: 2024-03-09 | Stop reason: HOSPADM

## 2024-03-07 RX ORDER — POTASSIUM CHLORIDE 7.45 MG/ML
10 INJECTION INTRAVENOUS PRN
Status: DISCONTINUED | OUTPATIENT
Start: 2024-03-07 | End: 2024-03-09 | Stop reason: HOSPADM

## 2024-03-07 RX ORDER — ACETAMINOPHEN 325 MG/1
650 TABLET ORAL EVERY 6 HOURS PRN
Status: DISCONTINUED | OUTPATIENT
Start: 2024-03-07 | End: 2024-03-09 | Stop reason: HOSPADM

## 2024-03-07 RX ORDER — POTASSIUM CHLORIDE 20 MEQ/1
40 TABLET, EXTENDED RELEASE ORAL PRN
Status: DISCONTINUED | OUTPATIENT
Start: 2024-03-07 | End: 2024-03-09 | Stop reason: HOSPADM

## 2024-03-07 RX ORDER — POLYETHYLENE GLYCOL 3350 17 G/17G
17 POWDER, FOR SOLUTION ORAL DAILY PRN
Status: DISCONTINUED | OUTPATIENT
Start: 2024-03-07 | End: 2024-03-09 | Stop reason: HOSPADM

## 2024-03-07 RX ORDER — ENOXAPARIN SODIUM 100 MG/ML
30 INJECTION SUBCUTANEOUS 2 TIMES DAILY
Status: DISCONTINUED | OUTPATIENT
Start: 2024-03-07 | End: 2024-03-09 | Stop reason: HOSPADM

## 2024-03-07 RX ADMIN — CARBAMAZEPINE 400 MG: 100 TABLET, EXTENDED RELEASE ORAL at 21:17

## 2024-03-07 RX ADMIN — SODIUM CHLORIDE, PRESERVATIVE FREE 10 ML: 5 INJECTION INTRAVENOUS at 21:17

## 2024-03-07 RX ADMIN — ASPIRIN 325 MG: 325 TABLET ORAL at 21:17

## 2024-03-07 RX ADMIN — ATORVASTATIN CALCIUM 40 MG: 20 TABLET, FILM COATED ORAL at 21:16

## 2024-03-07 RX ADMIN — DIVALPROEX SODIUM 375 MG: 125 TABLET, DELAYED RELEASE ORAL at 21:16

## 2024-03-07 RX ADMIN — ENOXAPARIN SODIUM 30 MG: 100 INJECTION SUBCUTANEOUS at 21:15

## 2024-03-07 RX ADMIN — DICYCLOMINE HYDROCHLORIDE 20 MG: 10 CAPSULE ORAL at 21:16

## 2024-03-07 RX ADMIN — IOPAMIDOL 80 ML: 755 INJECTION, SOLUTION INTRAVENOUS at 17:56

## 2024-03-07 NOTE — ED TRIAGE NOTES
Hx seizures, cardiac disease, states at 1pm today was feeling weak off balance and very nauseas, and blurry vision prior to 1pm he felt fine

## 2024-03-07 NOTE — ED PROVIDER NOTES
[facility] EMERGENCY DEPT  EMERGENCY DEPARTMENT ENCOUNTER    Patient Name: Manuel Linares  MRN: [id number]  YOB: 1970  Provider: Jesus Murillo MD  PCP: No primary care provider on file.   Time/Date of evaluation: 5:31 PM EST on 3/7/24    History of Presenting Illness     Chief Complaint   Patient presents with    Dizziness       History Provided by: Patient   History is limited by: Nothing    HISTORY (Narative):   Manuel Linares is a 53 y.o. male with a PMHX of HTN, hyperlipidemia, seizures  who presents to the emergency department (room 10) by POV C/O lightheadedness onset 1:00 PM. Associated sxs include weakness, nausea, blurry vision (resolved on arrival). Patient denies any other sxs or complaints.     Nursing Notes were all reviewed and agreed with or any disagreements were addressed in the HPI.    Past History     PAST MEDICAL HISTORY:  Past Medical History:   Diagnosis Date    High cholesterol     Hypertension     Seizures (HCC)        PAST SURGICAL HISTORY:  History reviewed. No pertinent surgical history.    FAMILY HISTORY:  History reviewed. No pertinent family history.    SOCIAL HISTORY:  Social History     Tobacco Use    Smoking status: Never   Substance Use Topics    Alcohol use: No    Drug use: No       MEDICATIONS:  Current Facility-Administered Medications   Medication Dose Route Frequency Provider Last Rate Last Admin    dicyclomine (BENTYL) capsule 20 mg  20 mg Oral Q6H Kahlil Navarro MD        divalproex (DEPAKOTE) DR tablet 375 mg  375 mg Oral 2 times per day Kahlil Navarro MD        carBAMazepine (TEGRETOL XR) extended release tablet 400 mg  400 mg Oral BID Kahlil Navarro MD        sodium chloride flush 0.9 % injection 5-40 mL  5-40 mL IntraVENous 2 times per day Kahlil Navarro MD        sodium chloride flush 0.9 % injection 5-40 mL  5-40 mL IntraVENous PRN Kahlil Navarro MD        0.9 % sodium chloride infusion   IntraVENous PRN Kahlil Navarro MD         consideration was given to the following tests and treatments.  Patient with some focal neurologic deficits on initial exam.  Code stroke was immediately activated.  Teleneurology evaluated the patient and felt that this was less likely a stroke and more likely encephalopathy versus a focal seizure.  They recommended admission for EEG and MR RI of the brain with and without contrast.  Discussed with local neurology who agreed with the plan as long as there is no LVO.  CT scan of the head showed no ICH.  CTA showed no LVO.  Patient was admitted to the hospitalist for further workup. The decision to perform testing and results were discussed with the patient.  I discussed each of these tests and considerations with the patient. They agree with the plan of admission.      ADDITIONAL CONSIDERATIONS:  None    Critical Care Time:     I have independently provided 40 minutes of non-concurrent critical care time involved in lab review, consultations with specialist, family decision-making, and documentation. This time does not include time spent on separately billable procedures.  During this entire length of time I was immediately available to the patient.    Critical Care:  The reason for providing this level of medical care for this critically ill patient was due a critical illness that impaired one or more vital organ systems such that there was a high probability of imminent or life threatening deterioration in the patients condition. This care involved high complexity decision making to assess, manipulate, and support vital system functions, to treat this degreee vital organ system failure and to prevent further life threatening deterioration of the patient’s condition.    Jesus Murillo MD    Diagnosis and Disposition     DISPOSITION Admitted 03/07/2024 07:27:30 PM    ADMISSION NOTE:    Critical Care Time: 40 minutes    Core Measures (for hospitalized patients):    1. Hospitalization Decision Time:  The decision to

## 2024-03-08 ENCOUNTER — APPOINTMENT (OUTPATIENT)
Facility: HOSPITAL | Age: 54
End: 2024-03-08
Payer: COMMERCIAL

## 2024-03-08 ENCOUNTER — APPOINTMENT (OUTPATIENT)
Facility: HOSPITAL | Age: 54
End: 2024-03-08
Attending: FAMILY MEDICINE
Payer: COMMERCIAL

## 2024-03-08 ENCOUNTER — APPOINTMENT (OUTPATIENT)
Facility: HOSPITAL | Age: 54
End: 2024-03-08
Attending: INTERNAL MEDICINE
Payer: COMMERCIAL

## 2024-03-08 PROBLEM — G40.909 SEIZURE DISORDER (HCC): Status: ACTIVE | Noted: 2024-03-08

## 2024-03-08 LAB
ECHO AO ASC DIAM: 3.3 CM
ECHO AO ASCENDING AORTA INDEX: 1.42 CM/M2
ECHO AO ROOT DIAM: 3.4 CM
ECHO AO ROOT INDEX: 1.46 CM/M2
ECHO AV AREA PEAK VELOCITY: 3.3 CM2
ECHO AV AREA VTI: 2.8 CM2
ECHO AV AREA/BSA PEAK VELOCITY: 1.4 CM2/M2
ECHO AV AREA/BSA VTI: 1.2 CM2/M2
ECHO AV MEAN GRADIENT: 4 MMHG
ECHO AV MEAN VELOCITY: 0.9 M/S
ECHO AV PEAK GRADIENT: 7 MMHG
ECHO AV PEAK VELOCITY: 1.3 M/S
ECHO AV VELOCITY RATIO: 0.85
ECHO AV VTI: 27.2 CM
ECHO BSA: 2.41 M2
ECHO BSA: 2.41 M2
ECHO LA DIAMETER INDEX: 1.67 CM/M2
ECHO LA DIAMETER: 3.9 CM
ECHO LA TO AORTIC ROOT RATIO: 1.15
ECHO LA VOL A-L A2C: 63 ML (ref 18–58)
ECHO LA VOL A-L A4C: 51 ML (ref 18–58)
ECHO LA VOL BP: 54 ML (ref 18–58)
ECHO LA VOL MOD A2C: 60 ML (ref 18–58)
ECHO LA VOL MOD A4C: 49 ML (ref 18–58)
ECHO LA VOL/BSA BIPLANE: 23 ML/M2 (ref 16–34)
ECHO LA VOLUME AREA LENGTH: 57 ML
ECHO LA VOLUME INDEX A-L A2C: 27 ML/M2 (ref 16–34)
ECHO LA VOLUME INDEX A-L A4C: 22 ML/M2 (ref 16–34)
ECHO LA VOLUME INDEX AREA LENGTH: 24 ML/M2 (ref 16–34)
ECHO LA VOLUME INDEX MOD A2C: 26 ML/M2 (ref 16–34)
ECHO LA VOLUME INDEX MOD A4C: 21 ML/M2 (ref 16–34)
ECHO LV E' LATERAL VELOCITY: 11 CM/S
ECHO LV E' SEPTAL VELOCITY: 11 CM/S
ECHO LV EDV A2C: 108 ML
ECHO LV EDV A4C: 115 ML
ECHO LV EDV BP: 111 ML (ref 67–155)
ECHO LV EDV INDEX A4C: 49 ML/M2
ECHO LV EDV INDEX BP: 48 ML/M2
ECHO LV EDV NDEX A2C: 46 ML/M2
ECHO LV EJECTION FRACTION A2C: 65 %
ECHO LV EJECTION FRACTION A4C: 60 %
ECHO LV EJECTION FRACTION BIPLANE: 62 % (ref 55–100)
ECHO LV ESV A2C: 38 ML
ECHO LV ESV A4C: 47 ML
ECHO LV ESV BP: 42 ML (ref 22–58)
ECHO LV ESV INDEX A2C: 16 ML/M2
ECHO LV ESV INDEX A4C: 20 ML/M2
ECHO LV ESV INDEX BP: 18 ML/M2
ECHO LV FRACTIONAL SHORTENING: 40 % (ref 28–44)
ECHO LV INTERNAL DIMENSION DIASTOLE INDEX: 1.72 CM/M2
ECHO LV INTERNAL DIMENSION DIASTOLIC: 4 CM (ref 4.2–5.9)
ECHO LV INTERNAL DIMENSION SYSTOLIC INDEX: 1.03 CM/M2
ECHO LV INTERNAL DIMENSION SYSTOLIC: 2.4 CM
ECHO LV IVSD: 1.3 CM (ref 0.6–1)
ECHO LV MASS 2D: 175.8 G (ref 88–224)
ECHO LV MASS INDEX 2D: 75.5 G/M2 (ref 49–115)
ECHO LV POSTERIOR WALL DIASTOLIC: 1.2 CM (ref 0.6–1)
ECHO LV RELATIVE WALL THICKNESS RATIO: 0.6
ECHO LVOT AREA: 3.8 CM2
ECHO LVOT AV VTI INDEX: 0.71
ECHO LVOT DIAM: 2.2 CM
ECHO LVOT MEAN GRADIENT: 2 MMHG
ECHO LVOT PEAK GRADIENT: 5 MMHG
ECHO LVOT PEAK VELOCITY: 1.1 M/S
ECHO LVOT STROKE VOLUME INDEX: 31.3 ML/M2
ECHO LVOT SV: 72.9 ML
ECHO LVOT VTI: 19.2 CM
ECHO MV A VELOCITY: 0.76 M/S
ECHO MV E DECELERATION TIME (DT): 192 MS
ECHO MV E VELOCITY: 0.76 M/S
ECHO MV E/A RATIO: 1
ECHO MV E/E' LATERAL: 6.91
ECHO MV E/E' RATIO (AVERAGED): 6.91
ECHO PV MAX VELOCITY: 1.1 M/S
ECHO PV MEAN GRADIENT: 2 MMHG
ECHO PV MEAN VELOCITY: 0.7 M/S
ECHO PV PEAK GRADIENT: 5 MMHG
ECHO RA END SYSTOLIC VOLUME APICAL 4 CHAMBER INDEX BSA: 8 ML/M2
ECHO RA VOLUME: 19 ML
ECHO RV TAPSE: 1.7 CM (ref 1.7–?)
EKG ATRIAL RATE: 80 BPM
EKG DIAGNOSIS: NORMAL
EKG P AXIS: 63 DEGREES
EKG P-R INTERVAL: 152 MS
EKG Q-T INTERVAL: 362 MS
EKG QRS DURATION: 76 MS
EKG QTC CALCULATION (BAZETT): 417 MS
EKG R AXIS: 17 DEGREES
EKG T AXIS: 34 DEGREES
EKG VENTRICULAR RATE: 80 BPM

## 2024-03-08 PROCEDURE — 95819 EEG AWAKE AND ASLEEP: CPT

## 2024-03-08 PROCEDURE — 80164 ASSAY DIPROPYLACETIC ACD TOT: CPT

## 2024-03-08 PROCEDURE — 80156 ASSAY CARBAMAZEPINE TOTAL: CPT

## 2024-03-08 PROCEDURE — 70553 MRI BRAIN STEM W/O & W/DYE: CPT

## 2024-03-08 PROCEDURE — A9577 INJ MULTIHANCE: HCPCS | Performed by: INTERNAL MEDICINE

## 2024-03-08 PROCEDURE — 93970 EXTREMITY STUDY: CPT

## 2024-03-08 PROCEDURE — 97530 THERAPEUTIC ACTIVITIES: CPT

## 2024-03-08 PROCEDURE — 6360000002 HC RX W HCPCS: Performed by: INTERNAL MEDICINE

## 2024-03-08 PROCEDURE — 97161 PT EVAL LOW COMPLEX 20 MIN: CPT

## 2024-03-08 PROCEDURE — 80157 ASSAY CARBAMAZEPINE FREE: CPT

## 2024-03-08 PROCEDURE — 82607 VITAMIN B-12: CPT

## 2024-03-08 PROCEDURE — 2580000003 HC RX 258: Performed by: INTERNAL MEDICINE

## 2024-03-08 PROCEDURE — 6370000000 HC RX 637 (ALT 250 FOR IP): Performed by: PSYCHIATRY & NEUROLOGY

## 2024-03-08 PROCEDURE — 93306 TTE W/DOPPLER COMPLETE: CPT

## 2024-03-08 PROCEDURE — 92610 EVALUATE SWALLOWING FUNCTION: CPT

## 2024-03-08 PROCEDURE — 97165 OT EVAL LOW COMPLEX 30 MIN: CPT

## 2024-03-08 PROCEDURE — 6360000004 HC RX CONTRAST MEDICATION: Performed by: INTERNAL MEDICINE

## 2024-03-08 PROCEDURE — 93306 TTE W/DOPPLER COMPLETE: CPT | Performed by: INTERNAL MEDICINE

## 2024-03-08 PROCEDURE — 1100000003 HC PRIVATE W/ TELEMETRY

## 2024-03-08 PROCEDURE — 93010 ELECTROCARDIOGRAM REPORT: CPT | Performed by: INTERNAL MEDICINE

## 2024-03-08 PROCEDURE — 82746 ASSAY OF FOLIC ACID SERUM: CPT

## 2024-03-08 PROCEDURE — 6370000000 HC RX 637 (ALT 250 FOR IP): Performed by: INTERNAL MEDICINE

## 2024-03-08 PROCEDURE — 36415 COLL VENOUS BLD VENIPUNCTURE: CPT

## 2024-03-08 RX ORDER — ATORVASTATIN CALCIUM 20 MG/1
20 TABLET, FILM COATED ORAL NIGHTLY
COMMUNITY

## 2024-03-08 RX ORDER — DIVALPROEX SODIUM 500 MG/1
500 TABLET, DELAYED RELEASE ORAL EVERY 12 HOURS SCHEDULED
Status: DISCONTINUED | OUTPATIENT
Start: 2024-03-08 | End: 2024-03-09 | Stop reason: HOSPADM

## 2024-03-08 RX ORDER — ATORVASTATIN CALCIUM 20 MG/1
20 TABLET, FILM COATED ORAL NIGHTLY
Status: DISCONTINUED | OUTPATIENT
Start: 2024-03-08 | End: 2024-03-09 | Stop reason: HOSPADM

## 2024-03-08 RX ADMIN — ATORVASTATIN CALCIUM 20 MG: 20 TABLET, FILM COATED ORAL at 20:32

## 2024-03-08 RX ADMIN — ENOXAPARIN SODIUM 30 MG: 100 INJECTION SUBCUTANEOUS at 09:35

## 2024-03-08 RX ADMIN — ASPIRIN 325 MG: 325 TABLET ORAL at 09:34

## 2024-03-08 RX ADMIN — ENOXAPARIN SODIUM 30 MG: 100 INJECTION SUBCUTANEOUS at 20:33

## 2024-03-08 RX ADMIN — DICYCLOMINE HYDROCHLORIDE 20 MG: 10 CAPSULE ORAL at 18:20

## 2024-03-08 RX ADMIN — CARBAMAZEPINE 400 MG: 100 TABLET, EXTENDED RELEASE ORAL at 09:35

## 2024-03-08 RX ADMIN — DIVALPROEX SODIUM 1500 MG: 500 TABLET, DELAYED RELEASE ORAL at 09:35

## 2024-03-08 RX ADMIN — SODIUM CHLORIDE, PRESERVATIVE FREE 10 ML: 5 INJECTION INTRAVENOUS at 20:33

## 2024-03-08 RX ADMIN — SODIUM CHLORIDE, PRESERVATIVE FREE 10 ML: 5 INJECTION INTRAVENOUS at 09:36

## 2024-03-08 RX ADMIN — CARBAMAZEPINE 400 MG: 100 TABLET, EXTENDED RELEASE ORAL at 20:54

## 2024-03-08 RX ADMIN — DICYCLOMINE HYDROCHLORIDE 20 MG: 10 CAPSULE ORAL at 12:49

## 2024-03-08 RX ADMIN — DIVALPROEX SODIUM 500 MG: 500 TABLET, DELAYED RELEASE ORAL at 20:32

## 2024-03-08 RX ADMIN — GADOBENATE DIMEGLUMINE 20 ML: 529 INJECTION, SOLUTION INTRAVENOUS at 08:25

## 2024-03-08 ASSESSMENT — PAIN SCALES - GENERAL
PAINLEVEL_OUTOF10: 0
PAINLEVEL_OUTOF10: 2
PAINLEVEL_OUTOF10: 0
PAINLEVEL_OUTOF10: 2
PAINLEVEL_OUTOF10: 0
PAINLEVEL_OUTOF10: 3

## 2024-03-08 ASSESSMENT — PAIN DESCRIPTION - DESCRIPTORS
DESCRIPTORS: THROBBING
DESCRIPTORS: THROBBING

## 2024-03-08 ASSESSMENT — PAIN DESCRIPTION - LOCATION
LOCATION: HIP
LOCATION: HIP

## 2024-03-08 NOTE — H&P
HISTORY & PHYSICAL      Patient: Manuel Linares MRN: [id number]  CSN: [id number]    YOB: 1970  Age: 53 y.o.  Sex: male    DOA: 3/7/2024 LOS:  LOS: 0 days        DOA: 3/7/2024        Assessment/Plan     53 years old presented with sudden onset of dizziness, unsteadiness, nausea, and generalized weakness.  Symptoms could be due to cerebrovascular event, versus atypical seizure, versus other etiology.  The patient is admitted to telemetry  Neurochecks  Daily aspirin  Statin  PT OT and speech therapy consults  Requested MRI of the brain with and without contrast, and EEG per recommendation of neurology  Neurology was consulted by ED  Hold home antihypertensives for autoregulation    Seizure disorder  Continue home Depakote and Tegretol    Hypertension  Holding home hypertensives as above  Monitor blood pressure    Lovenox for DVT prophylaxis    Admission plan was discussed      History of Presenting Illness:  53 years old who is admitted for suspected stroke versus focal seizure versus other.  He presented to ED with sudden onset of unsteadiness, dizziness, generalized weakness and nausea at 1 PM.  No speech changes, drooling, facial asymmetry, or focal weakness, and no witnessed seizure activity.  He has medical history significant for seizure disorder and hypertension..Per ED, he appeared mildly confused and had difficulty following complex commands.  In addition to impaired coordination and proprioception.  Since presentation, patient reports improvement in his symptom.  CT scan of the head showed no acute stroke, and CTA of the head and neck showed no large vessel disease.  He was evaluated by teleneurology in ED who recommended admission for MRI of the brain, EEG, and further workup.  The patient has no other concerns.    Past Medical History:   Diagnosis Date    High cholesterol     Hypertension     Seizures (HCC)    FH, HTN  Social History     Socioeconomic History    Marital status: Single

## 2024-03-08 NOTE — CONSULTS
NEUROLOGY CONSULTATION NOTE    Patient: Manuel Linares MRN: [id number]  CSN: [id number]    YOB: 1970  Age: 53 y.o.  Sex: male    DOA: 3/7/2024 LOS:  LOS: 1 day        Requesting Physician: Dr. Murillo   Reason for Consultation: Unsteady gait               HISTORY OF PRESENT ILLNESS:   Manuel Linares is a 53 y.o. Right-handed male with history of Hypertension, hyperlipidemia, NASEEM, seizure disorder with complex Partial seizures, who presented yesterday with lightheadedness and gait unsteadiness. Apparently, the patient was feeling unsteady, lightheaded when He left his work. He was wobbly while walking. He didn't lose consciousness. He denies any lateralized weakness or numbness. He denies speech or swallowing difficulties. He was able to drive his car for some short distance and from then on, his girlfriend picked him up and brought him to the hospital. The patient is on carbamazepine 400 mg twice a day and Depakote 500 mg twice a day for seizure disorder.  He doesn't remember seeing a neurologist. He is being followed by primary care at [place]. His last seizure was four years ago. Seizures are described as brief loss of consciousness. When he presented to ER, his CMP was within normal limits except for slightly increased glucose. CPC was normal. Urine drug toxicity screening was negative. Head CT and CTA were negative. His brain MRI is negative for an acute event.    PAST MEDICAL HISTORY:  Past Medical History:   Diagnosis Date    High cholesterol     Hypertension     Seizures (HCC)      PAST SURGICAL HISTORY:  History reviewed. No pertinent surgical history.  FAMILY HISTORY:  History reviewed. No pertinent family history.  SOCIAL HISTORY:  Social History     Socioeconomic History    Marital status: Single     Spouse name: None    Number of children: None    Years of education: None    Highest education level: None   Tobacco Use    Smoking status: Never   Substance and Sexual Activity    Alcohol  left internal carotid artery: Patent with no significant stenosis by NASCET criteria. Right vertebral artery: Patent Left vertebral artery: Patent CTA HEAD: Right cavernous internal carotid artery: Patent Left cavernous internal carotid artery: Patent Anterior cerebral arteries: Patent Anterior communicating artery: Patent Right middle cerebral artery: Patent Left middle cerebral artery: Patent Posterior communicating arteries: Patent Posterior cerebral arteries: Patent Basilar artery: Patent Distal vertebral arteries: Patent Measurements use NASCET criteria.     1. No acute large-vessel arterial occlusion or significant stenosis.     ASSESSMENT/IMPRESSION:  Lightheadedness and unsteady gait, which may be Due to medication levels, I'm not sure. This may be cardiogenic and etiology as well. Echocardiogram is pending. The patient doesn't have any weakness. MRI is negative. This doesn't look like cerebrovascular event. Therefore, we need to discontinue stroke pathway. We are going to obtain EEG. The patient is not following with neurology as outpatient. I'm going to follow this patient is outpatient.    RECOMMENDATIONS:  Continue Depakote  mg twice a day  Continue carbamazepine 400 mg twice a day.  EEG awake/asleep  Check serum Depakote and carbamazepine levels  I'm also going to check serum B12/folate levels.  The patient should be able to go home after the EEG.  Discontinue stroke pathway measures.      I will follow the patient as outpatient. Please do not hesitate to return with any questions.    ------------------------------------  Shayne Harper MD  3/8/2024  10:39 AM

## 2024-03-08 NOTE — PLAN OF CARE
Problem: Discharge Planning  Goal: Discharge to home or other facility with appropriate resources  Outcome: Progressing  Flowsheets (Taken 3/8/2024 1386)  Discharge to home or other facility with appropriate resources:   Identify barriers to discharge with patient and caregiver   Arrange for needed discharge resources and transportation as appropriate     Problem: Pain  Goal: Verbalizes/displays adequate comfort level or baseline comfort level  Outcome: Progressing

## 2024-03-08 NOTE — PROCEDURES
ELECTROENCEPHALOGRAPHY     Patient: Manuel Linares MRN: [id number]  CSN: [id number]    YOB: 1970  Age: 53 y.o.  Sex: male    DOA: 3/7/2024 LOS:  LOS: 1 day        Date of Service: 3/8/2024    DICTATING: Shayne Harper MD     REFERRING PHYSICIAN: Dr. Murillo    ELECTROENCEPHALOGRAM NUMBER: [id number]    HISTORY OF PRESENT ILLNESS: This is a 53-year-old gentleman with history of seizure disorder, who presented with dizziness and lightheadedness, concerning for complex partial seizure. This EEG was performed in order to assess electrodiagnostic risk factors for epilepsy.     ELECTROENCEPHALOGRAM INTERPRETATION: This is a referential and bipolar EEG recorded with a 10-20 system. EEG background during wakefulness shows 8.5 hertz posterior dominant rhythm, which disappears with eye opening or activation procedures. Drowsiness is accompanied by loss of posterior dominant rhythm and generalized alpha and theta activities. A stage II sleep pattern is accompanied by vertex waves, sleep spindles and K-complexes. Photic stimulation does not produce an abnormal activity.     There are intermittent left frontotemporal interictal discharges during the study.     IMPRESSION: This EEG is abnormal due to left frontal temporal interictal discharges, which indicate increased risk factor for seizure disorder and possible localization for the seizures.         Signed:  Shayne Harper MD  3/8/2024  3:58 PM

## 2024-03-08 NOTE — CARE COORDINATION
This CM met with the patient at the bedside to discuss DC planning. The patient states he lives alone and prior to admission he is still works full time and drives. The patient denies using any DME at home and states he can get his medications with no barriers. The patients plan is pending neuro work up and pt/ot evals. Will continue to follow.   
to: Apartment   Services At/After Discharge   Transition of Care Consult (CM Consult) Discharge Planning   Services At/After Discharge None   Mode of Transport at Discharge Other (see comment)  (Family will drive him home)   Condition of Participation: Discharge Planning   The Plan for Transition of Care is related to the following treatment goals: Patient states he would like to get better and return to home and work   The Patient and/or Patient Representative was provided with a Choice of Provider? Patient   The Patient and/Or Patient Representative agree with the Discharge Plan? Yes

## 2024-03-08 NOTE — PROGRESS NOTES
Hospitalist Progress Note    Patient: Manuel Linares MRN: [id number]  CSN: [id number]    YOB: 1970  Age: 53 y.o.  Sex: male    DOA: 3/7/2024 LOS:  LOS: 2 days          Chief Complaint:    dizziness, unsteadiness, nausea, and generalized weakness.      Assessment/Plan   53 years old presented with sudden onset of dizziness, unsteadiness, nausea, and generalized weakness.  Symptoms could be due to cerebrovascular event, versus atypical seizure, versus other etiology.  The patient is admitted to telemetry  Neurochecks  Daily aspirin  Statin  PT OT and speech therapy consults  Requested MRI of the brain with and without contrast, and EEG per recommendation of neurology  Neurology was consulted by ED  Hold home antihypertensives for autoregulation    MRI, results pending  Echo to be done   EEG to be done     Seizure disorder  Continue home Depakote and Tegretol     Hypertension  Holding home hypertensives as above  Monitor blood pressure    BLE edema  Family history of blood clots -father had DVTs  Ordered NIV Doppler BLE      Lovenox for DVT prophylaxis       Active Hospital Problems    Diagnosis Date Noted    HTN (hypertension) [I10] 03/09/2024    Bilateral lower extremity edema [R60.0] 03/09/2024    Seizure disorder (HCC) [G40.909] 03/08/2024    Ataxia [R27.0] 03/07/2024       Disposition : TBD      Subjective:    I am waiting for my echo and EEG  I have swelling in my legs   No cp, sob, roseann    Review of systems:    Constitutional: denies fevers, chills, myalgias  Respiratory: denies SOB, cough  Cardiovascular: denies chest pain, palpitations  Gastrointestinal: denies nausea, vomiting, diarrhea      Vital signs/Intake and Output:  Visit Vitals  BP (!) 165/106   Pulse 62   Temp 98.2 °F (36.8 °C) (Oral)   Resp 18   Ht 1.778 m (5' 10\")   Wt 117.9 kg (260 lb)   SpO2 100%   BMI 37.31 kg/m²     Current Shift:  No intake/output data recorded.  Last three shifts:  03/07 1901 - 03/09 0700  In: 1080  history  Performing a medically necessary appropriate examination and/or evaluation  Counseling and educating the patient/family/caregiver  Ordering medications, tests, or procedures  Referring and communicating with other health care professionals as needed  Documenting clinical information in the electronic or other health record  Independently interpreting results (not reported separately) and communicating results to the patient/family/caregiver  Care coordination and discharge planning with Case Management.

## 2024-03-08 NOTE — PROGRESS NOTES
PHYSICAL THERAPY EVALUATION/DISCHARGE    Patient: Manuel Linares (53 y.o. male)  Date: 3/8/2024  Primary Diagnosis: Ataxia [R27.0]  Encephalopathy [G93.40]     Precautions:   PLOF: Independent ambulation PTA. Lives alone in 3rd flr single level apartment with elevator entry.  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    ASSESSMENT AND RECOMMENDATIONS:  Based on the objective data described below, the patient presents with ROM/motor performance WNL and equal Bilateral extremities.  Pt w/o sensory deficit. Performs bed mobility and transfers with independence.  Able to demonstrate ambulation >300ft with independence GB applied on level surface w/o deviation and negotiate box step as noted below.  Pt does report soreness L hip 5/10 present PTA which pt feels is from working so much (as noted above); advised to see PCP if issue continues in the future.  No further skilled PT indicated at this time.  Nurse Bonny to be notified of same.    Patient does not require further skilled physical therapy intervention at this level of care.    Further Equipment Recommendations for Discharge: none    [facility]: AM-PAC Inpatient Mobility Raw Score : 24     Current research shows that an AM-PAC score of 17 (13 without stairs) or less is not associated with a discharge to the patient's home setting. Based on an AM-PAC score and their current functional mobility deficits, it is recommended that the patient have 5-7 sessions per week of Physical Therapy at d/c to increase the patient's independence.  Currently, this patient demonstrates the potential endurance, and/or tolerance for 3 hours of therapy each day at d/c.    Current research shows that an AM-PAC score of 17 (13 without stairs) or less is not associated with a discharge to the patient's home setting. Based on an AM-PAC score and their current functional mobility deficits, it is recommended that the

## 2024-03-08 NOTE — PROGRESS NOTES
cholesterol     Hypertension     Seizures (HCC)    History reviewed. No pertinent surgical history.  Barriers to Learning/Limitations: none  Compensate with: visual, verbal, tactile, kinesthetic cues/model    Home Situation:   Social/Functional History  Lives With: Alone  Type of Home: Apartment  Home Layout: One level  Home Access: Elevator, Level entry  Bathroom Shower/Tub: Tub/Shower unit  Bathroom Toilet: Standard  Bathroom Equipment: None  Home Equipment: None  ADL Assistance: Independent  Homemaking Assistance: Independent  Ambulation Assistance: Independent  Transfer Assistance: Independent  Active : Yes  Mode of Transportation: Car  Occupation: Full time employment  Type of Occupation: [employer] station full time and [employer] part time  []  Right hand dominant   []  Left hand dominant    Cognitive/Behavioral Status:     WNL                               Skin: intact where visualized  Edema: in feet    Vision/Perceptual:    Vision: Impaired                          Coordination: BUE  Coordination: Within functional limits     Coordination: Within functional limits      Balance:     Balance  Sitting: Intact  Standing: Intact    Strength: BUE  Strength: Within functional limits  Strength: Within functional limits    Tone & Sensation: BUE  Tone: Normal  Sensation: Intact  Tone: Normal  Sensation: Intact    Range of Motion: BUE  AROM: Within functional limits     AROM: Within functional limits  PROM: Within functional limits    Functional Mobility and Transfers for ADLs:  Bed Mobility:     Bed Mobility Training  Bed Mobility Training: Yes  Supine to Sit: Independent  Sit to Supine: Independent  Scooting: Independent  Transfers:                 Transfer Training  Transfer Training: Yes  Sit to Stand: Independent  Stand to Sit: Independent    ADL Assessment:      Feeding: Independent  Grooming: Independent  UE Bathing: Independent  LE Bathing: Independent  UE Dressing: Independent  LE Dressing:  Independent  Toileting: Independent    Pain:  Pain level pre-treatment: 7/10   Pain level post-treatment: 7/10   Pain Intervention(s): Rest, Ice, Repositioning   Response to intervention: Nurse notified    Activity Tolerance:   Activity Tolerance: Patient Tolerated treatment well  Please refer to the flowsheet for vital signs taken during this treatment.    After treatment:   [] Patient left in no apparent distress sitting up in chair  [x] Patient left in no apparent distress in bed  [x] Call bell left within reach  [] Nursing notified  [] Caregiver present  [] Bed alarm activated    COMMUNICATION/EDUCATION:   Patient Education  Education Given To: Patient  Education Provided: Role of Therapy;Transfer Training;Plan of Care;Fall Prevention Strategies  Education Method: Verbal  Barriers to Learning: None  Education Outcome: Verbalized understanding;Demonstrated understanding    Thank you for this referral.  MARILOU Adams, OTR/L  Minutes: 20    Eval Complexity: Decision Making: Low Complexity

## 2024-03-08 NOTE — PROGRESS NOTES
SPEECH LANGUAGE PATHOLOGY BEDSIDE SWALLOW EVALUATION AND DISCHARGE    Recommendations:   Regular diet with thin liquids  Meds as tolerated  Aspiration precautions  HOB >45 degrees during all intake and for at least 30 min after intake  Small bites/sips, Feed slowly, alternating bites/sips   Oral care three times daily       Patient: Manuel Linares (53 y.o. male)  Date: 3/8/2024  Primary Diagnosis: Ataxia [R27.0]  Encephalopathy [G93.40]       Precautions: Aspiration   PLOF: As per H&P    ASSESSMENT:  Based on the objective data described below, the patient presents with oropharyngeal swallow function essentially WFL. Strength, ROM, and coordination of the orofacial musculature were all found to be WFL. Dentition is sparse w/ pt reporting his teeth were \"left at home.\" Pt tolerated reg solid, puree, and thin liquids +/- straw consecutive swallows without any overt signs/symptoms of aspiration. Mastication was appropriate with timely a-p transit. Positive oral clearance observed across all trials. Pt safe for continuation of reg solid diet with thin liquids, aspiration precautions, oral care TID, and meds as tolerated. No further skilled SLP services are indicated at this time. Please re-consult if needed.      PLAN:  Recommendations and Planned Interventions:  No formal ST needs identified for dysphagia. Eval only.   Discharge Recommendations:       SUBJECTIVE:   Patient stated “My speech sounds different when I don't have my teeth in”.    OBJECTIVE:     Past Medical History:   Diagnosis Date    High cholesterol     Hypertension     Seizures (HCC)    History reviewed. No pertinent surgical history.  Prior Level of Function/Home Situation:  Social/Functional History  Lives With: Alone  Type of Home: Apartment  Home Layout: One level  Home Access: Elevator, Level entry  Bathroom Shower/Tub: Tub/Shower unit  Bathroom Toilet: Standard  Bathroom Equipment: None  Home Equipment: None  ADL Assistance:

## 2024-03-09 VITALS
BODY MASS INDEX: 37.22 KG/M2 | WEIGHT: 260 LBS | HEIGHT: 70 IN | HEART RATE: 76 BPM | RESPIRATION RATE: 18 BRPM | OXYGEN SATURATION: 99 % | DIASTOLIC BLOOD PRESSURE: 101 MMHG | SYSTOLIC BLOOD PRESSURE: 134 MMHG | TEMPERATURE: 98 F

## 2024-03-09 PROBLEM — R60.0 BILATERAL LOWER EXTREMITY EDEMA: Status: ACTIVE | Noted: 2024-03-09

## 2024-03-09 PROBLEM — I10 HTN (HYPERTENSION): Status: ACTIVE | Noted: 2024-03-09

## 2024-03-09 LAB
CARBAMAZEPINE FREE SERPL-MCNC: NORMAL UG/ML
CARBAMAZEPINE SERPL-MCNC: 12.7 UG/ML (ref 4–12)
CARBAMAZEPINE, TOTAL: 6.5 UG/ML (ref 4–12)
FOLATE SERPL-MCNC: 4.7 NG/ML (ref 3.1–17.5)
VALPROATE SERPL-MCNC: 136 UG/ML (ref 50–100)
VIT B12 SERPL-MCNC: 767 PG/ML (ref 211–911)

## 2024-03-09 PROCEDURE — 6370000000 HC RX 637 (ALT 250 FOR IP): Performed by: FAMILY MEDICINE

## 2024-03-09 PROCEDURE — 6370000000 HC RX 637 (ALT 250 FOR IP): Performed by: INTERNAL MEDICINE

## 2024-03-09 PROCEDURE — 6360000002 HC RX W HCPCS: Performed by: INTERNAL MEDICINE

## 2024-03-09 PROCEDURE — 6370000000 HC RX 637 (ALT 250 FOR IP): Performed by: PSYCHIATRY & NEUROLOGY

## 2024-03-09 PROCEDURE — 2580000003 HC RX 258: Performed by: INTERNAL MEDICINE

## 2024-03-09 RX ORDER — LISINOPRIL 20 MG/1
20 TABLET ORAL DAILY
Status: DISCONTINUED | OUTPATIENT
Start: 2024-03-09 | End: 2024-03-09 | Stop reason: HOSPADM

## 2024-03-09 RX ORDER — PROPRANOLOL HYDROCHLORIDE 20 MG/1
20 TABLET ORAL 3 TIMES DAILY
Status: DISCONTINUED | OUTPATIENT
Start: 2024-03-09 | End: 2024-03-09 | Stop reason: HOSPADM

## 2024-03-09 RX ADMIN — ENOXAPARIN SODIUM 30 MG: 100 INJECTION SUBCUTANEOUS at 08:19

## 2024-03-09 RX ADMIN — LISINOPRIL 20 MG: 20 TABLET ORAL at 10:38

## 2024-03-09 RX ADMIN — SODIUM CHLORIDE, PRESERVATIVE FREE 10 ML: 5 INJECTION INTRAVENOUS at 10:54

## 2024-03-09 RX ADMIN — DIVALPROEX SODIUM 500 MG: 500 TABLET, DELAYED RELEASE ORAL at 08:19

## 2024-03-09 RX ADMIN — CARBAMAZEPINE 400 MG: 100 TABLET, EXTENDED RELEASE ORAL at 08:19

## 2024-03-09 RX ADMIN — PROPRANOLOL HYDROCHLORIDE 20 MG: 20 TABLET ORAL at 10:38

## 2024-03-09 ASSESSMENT — PAIN SCALES - GENERAL: PAINLEVEL_OUTOF10: 0

## 2024-03-09 NOTE — PLAN OF CARE
Problem: Discharge Planning  Goal: Discharge to home or other facility with appropriate resources  Outcome: Progressing  Flowsheets (Taken 3/9/2024 0808)  Discharge to home or other facility with appropriate resources:   Identify barriers to discharge with patient and caregiver   Arrange for needed discharge resources and transportation as appropriate     Problem: Pain  Goal: Verbalizes/displays adequate comfort level or baseline comfort level  Outcome: Progressing

## 2024-03-09 NOTE — FLOWSHEET NOTE
Discussed AVS with Patient. All questioned were answered and Patient verbalized understanding. All lines and telemetry monitor were removed. Patient was provided education about current medications, and prescriptions that needed to be picked up. Patient left via personal transport with all personal belongings.

## 2024-03-09 NOTE — PLAN OF CARE
Problem: Discharge Planning  Goal: Discharge to home or other facility with appropriate resources  3/9/2024 1531 by Skinny Razo RN  Outcome: Adequate for Discharge  3/9/2024 1129 by Skinny Razo RN  Outcome: Progressing  Flowsheets (Taken 3/9/2024 0808)  Discharge to home or other facility with appropriate resources:   Identify barriers to discharge with patient and caregiver   Arrange for needed discharge resources and transportation as appropriate     Problem: Pain  Goal: Verbalizes/displays adequate comfort level or baseline comfort level  3/9/2024 1531 by Skinny Razo RN  Outcome: Adequate for Discharge  3/9/2024 1129 by Skinny Razo RN  Outcome: Progressing     Problem: Safety - Adult  Goal: Free from fall injury  Outcome: Adequate for Discharge

## 2024-03-09 NOTE — PROGRESS NOTES
Chart reviewed. Serum medication levels are pending. We will follow these results as outpatient.    From neurology perspective, the patient can be discharged home. He will be followed as outpatient.

## 2024-03-09 NOTE — DISCHARGE SUMMARY
Discharge Summary    Patient: Manuel Linares MRN: [id number]  CSN: [id number]    YOB: 1970  Age: 53 y.o.  Sex: male    DOA: 3/7/2024 LOS:  LOS: 2 days   Discharge Date:      Primary Care Provider:  No primary care provider on file.    Admission Diagnoses: Ataxia [R27.0]  Encephalopathy [G93.40]    Discharge Diagnoses:    Active Hospital Problems    Diagnosis Date Noted    HTN (hypertension) [I10] 03/09/2024    Bilateral lower extremity edema [R60.0] 03/09/2024    Seizure disorder (HCC) [G40.909] 03/08/2024    Ataxia [R27.0] 03/07/2024       Discharge Medications:        Medication List        CONTINUE taking these medications      atorvastatin 20 MG tablet  Commonly known as: LIPITOR     carBAMazepine 400 MG extended release tablet  Commonly known as: TEGRETOL XR     divalproex 500 MG DR tablet  Commonly known as: DEPAKOTE     lisinopril 20 MG tablet  Commonly known as: PRINIVIL;ZESTRIL     ondansetron 4 MG tablet  Commonly known as: ZOFRAN     propranolol 10 MG tablet  Commonly known as: INDERAL            ASK your doctor about these medications      dicyclomine 20 MG tablet  Commonly known as: BENTYL                Discharge Condition: Stable    Procedures : EEG    Consults: Neurology      PHYSICAL EXAM   Visit Vitals  BP (!) 134/101   Pulse 76   Temp 98 °F (36.7 °C) (Oral)   Resp 18   Ht 1.778 m (5' 10\")   Wt 117.9 kg (260 lb)   SpO2 99%   BMI 37.31 kg/m²     General: Awake, cooperative, no acute distress    HEENT: NC, Atraumatic.  PERRLA, EOMI. Anicteric sclerae.  Lungs:  CTA Bilaterally. No Wheezing/Rhonchi/Rales.  Heart:  Regular  rhythm,  No murmur, No Rubs, No Gallops  Abdomen: Soft, Non distended, Non tender. +Bowel sounds,   Extremities: No c/c/e  Psych:   Not anxious or agitated.  Neurologic:  No acute neurological deficits.                                     Admission HPI : 53 years old who is admitted for suspected stroke versus focal seizure versus other.  He presented to ED with

## 2024-03-11 LAB
CARBAMAZEPINE FREE SERPL-MCNC: 2.3 UG/ML (ref 0.6–4.2)
CARBAMAZEPINE, TOTAL: 6.5 UG/ML (ref 4–12)